# Patient Record
Sex: FEMALE | Race: WHITE | NOT HISPANIC OR LATINO | Employment: PART TIME | URBAN - NONMETROPOLITAN AREA
[De-identification: names, ages, dates, MRNs, and addresses within clinical notes are randomized per-mention and may not be internally consistent; named-entity substitution may affect disease eponyms.]

---

## 2017-01-23 NOTE — ED.PDOC
General


ED Provider: 


Dr. HARI BALDERAS





Chief Complaint: Urinary Problem


Stated Complaint: Burning frequency of urination for couple days, taki Azo not 

getting better.


Time Seen by Physician: 23:22


Mode of Arrival: Walk-In


Information Source: Patient


Nursing and Triage Documentation Reviewed and Agree: Yes





 Complaint Exam





- UTI Female Complaint/Exam


Patient Complains of: Reports: Painful urination


Symptoms Are: Still present


Timing: Constant


Initial Severity: Moderate


Current Severity: Moderate


Location of Pain: Reports: Suprapubic


Associated Signs and Symptoms: Denies: Fever, Chills, Flank pain, Dyspareunia, 

Vaginal discharge


Patient Rh Status: Unknown


: 1


Para: 1


Hx Total # of Abortions (Spontaneous & Elective): 0


Related Surgical History: Reports: None


CVA Tenderness: No


Suprapubic Tenderness: Yes


Differential Diagnoses: Cystitis





Review of Systems





- Review Of Systems


Constitutional: Reports: No symptoms


Eyes: Reports: No symptoms


Ears, Nose, Mouth, Throat: Reports: No symptoms


Respiratory: Reports: No symptoms


Cardiac: Reports: No symptoms


GI: Reports: No symptoms


: Reports: Burning, Dysuria, Frequency


Musculoskeletal: Reports: No symptoms


Skin: Reports: No symptoms


Neurological: Reports: No symptoms


Endocrine: Reports: No symptoms


Hematologic/Lymphatic: Reports: No symptoms


All Other Systems: Reviewed and Negative





Past Medical History





- Past Medical History


Previously Healthy: Yes


Endocrine: Reports: None


Cardiovascular: Reports: None


Respiratory: Reports: None


Hematological: Reports: None


Gastrointestinal: Reports: None


Genitourinary: Reports: None


Neuro/Psych: Reports: None


Musculoskeletal: Reports: None


Cancer: Reports: None


Last Menstrual Period: GETS DEPO SHOT,  VERY IRREGULAR





- Surgical History


General Surgical History: Reports: Tubal ligation, Cholecystectomy





- Family History


Family History: Reports: None





- Social History


Smoking Status: Current every day smoker, Heavy tobacco smoker


Smoking Cessation Counseling Time: > 10 min


Hx Substance Use: No


Alcohol Screening: Occasionally





- Immunizations


Tetanus Shot up to Date: Yes





Physical Exam





- Physical Exam


Appearance: Well-appearing, No pain distress, Well-nourished


Eyes: OTIS, EOMI, Conjunctiva clear


ENT: Ears normal, Nose normal, Oropharynx normal


Respiratory: Airway patent, Breath sounds clear, Breath sounds equal, 

Respirations nonlabored


Cardiovascular: RRR, Pulses normal, No rub, No murmur


GI/: Soft, Nontender, No masses, Bowel sounds normal, No Organomegaly


Musculoskeletal: Normal strength, ROM intact, No edema, No calf tenderness


Skin: Warm, Dry, Normal color


Neurological: Sensation intact, Motor intact, Reflexes intact, Cranial nerves 

intact, Alert, Oriented


Psychiatric: Affect appropriate, Mood appropriate





Critical Care Note





- Critical Care Note


Total Time (mins): 0





Course





- Course


Orders, Labs, Meds: 





Lab Review











  17





  23:10


 


Urine Color  Orange


 


Urine Clarity  Clear


 


Urine pH  7.0


 


Ur Specific Gravity  1.020


 


Urine Protein  1+


 


Urine Glucose (UA)  Trace


 


Urine Ketones  Trace


 


Urine Blood  2+


 


Urine Nitrite  Positive


 


Urine Bilirubin  Negative


 


Urine Urobilinogen  2.0


 


Ur Leukocyte Esterase  Negative


 


Urine Microscopic RBC  5-10


 


Urine Microscopic WBC  2-5


 


Ur Squamous Epith Cells  5-10


 


Urine Bacteria  1+








Orders











 Category Date Time Status


 


 UA [URINALYSIS C & S IF INDICATED] Stat LAB  17 23:10 Completed


 


 URINE CULTURE Stat LAB  17 23:21 Received











Vital Signs: 





 











  Temp Pulse Resp BP Pulse Ox


 


 17 22:52  98.9 F  98 H  18  114/74  98














Departure





- Departure


Time of Disposition: 23:25


Disposition: HOME SELF-CARE


Discharge Problem: 


UTI (urinary tract infection)


Qualifiers:


 Urinary tract infection type: acute cystitis Hematuria presence: with 

hematuria Qualifier Code: (N30.01) Acute cystitis with hematuria





Instructions:  Urinary Tract Infection in Women (ED)


Condition: Stable


Pt referred to PMD for follow-up: Yes


Additional Instructions: 


INCREASE HYDRATION


CONTINUE AZO


MEDICATION SIDE EFFECTS DISCUSSED





Prescriptions: 


Sulfamethoxazole/Trimethoprim [Bactrim Ds 800/160 mg] 1 tab PO Q12HR #20 tablet


Allergies/Adverse Reactions: 


Allergies





No Known Allergies Allergy (Verified 17 22:59)


 








Home Medications: 


Ambulatory Orders





Medroxyprogesterone Acetate [Depo-Provera] 150 mg IM AS DIRECTED 12/15/16 


Sulfamethoxazole/Trimethoprim [Bactrim Ds 800/160 mg] 1 tab PO Q12HR #20 tablet 

17 








Disposition Discussed With: Patient

## 2017-08-03 NOTE — ED.PDOC
General


ED Provider: 


Dr. HARI BALDERAS





Chief Complaint: Non-specific Complaint


Stated Complaint: dizziness weakness, had some congestion and ears full.


Time Seen by Physician: 23:51


Mode of Arrival: Walk-In


Information Source: Patient


Nursing and Triage Documentation Reviewed and Agree: Yes





Neurological Complaint Exam





- Weakness Complaint/Exam


Onset: Gradual


Symptoms Are: Still present


Timing: Intermittent


Episodes Lasting: Hours


Initial Severity: Mild


Current Severity: Mild


Character: Reports: Lightheaded


Aggravating: Reports: Position change


Alleviating: Reports: None


Associated Signs and Symptoms: Denies: Nausea, Vomiting, Diaphoresis, Tinnitus, 

Chest pain, Short of air, Palpitations, Unsteady gait, GI blood loss, Visual 

changes, Decreased oral intake, Change in medication, Change in diet, OTC meds, 

Loss of balance


Cardiac Risk Factors: Reports: None


CVA Risk Factors: Reports: None


Related Surgical History: Reports: None


JVD Present: No


Carotid Bruit Present: No


Rectal Heme Positive: No


Nystagmus Present: No


Gag Reflex Present: No


Meningeal Signs Positive: No


Focal Weakness: Present: None


Focal Sensory Loss: Present: None


Gait: Normal


Finger-to-Nose: Normal Findings


Romberg Test Positive: No


Babinski Sign: Negative Right, Negative Left


Heel to Toe Normal: No


Differential Diagnoses: Labyrinthitis, Meniere's, Metabolic abnormalities, 

Other (viral syndrome)





Review of Systems





- Review Of Systems


Constitutional: Reports: Malaise, Weakness


Eyes: Reports: No symptoms


Ears, Nose, Mouth, Throat: Reports: No symptoms


Respiratory: Reports: No symptoms


Cardiac: Reports: No symptoms


GI: Reports: No symptoms


: Reports: No symptoms


Musculoskeletal: Reports: No symptoms


Skin: Reports: No symptoms


Neurological: Reports: No symptoms


Endocrine: Reports: No symptoms


Hematologic/Lymphatic: Reports: No symptoms


All Other Systems: Reviewed and Negative





Past Medical History





- Past Medical History


Previously Healthy: Yes


Endocrine: Reports: None


Cardiovascular: Reports: None


Respiratory: Reports: None


Hematological: Reports: None


Gastrointestinal: Reports: None


Genitourinary: Reports: None


Neuro/Psych: Reports: None


Musculoskeletal: Reports: None


Cancer: Reports: None


Last Menstrual Period: YESTERDAY, IRREGULAR





- Surgical History


General Surgical History: Reports: Tubal ligation, Cholecystectomy





- Family History


Family History: Reports: None





- Social History


Smoking Status: Current every day smoker, Heavy tobacco smoker


Hx Substance Use: No


Alcohol Screening: Occasionally





- Immunizations


Tetanus Shot up to Date: Yes





Physical Exam





- Physical Exam


Appearance: Well-appearing, No pain distress, Well-nourished


Eyes: OTIS, EOMI, Conjunctiva clear


ENT: Ears normal, Nose normal, Oropharynx normal


Respiratory: Airway patent, Breath sounds clear, Breath sounds equal, 

Respirations nonlabored


Cardiovascular: RRR, Pulses normal, No rub, No murmur


GI/: Soft, Nontender, No masses, Bowel sounds normal, No Organomegaly


Musculoskeletal: Normal strength, ROM intact, No edema, No calf tenderness


Skin: Warm, Dry, Normal color


Neurological: Sensation intact, Motor intact, Reflexes intact, Cranial nerves 

intact, Alert, Oriented


Psychiatric: Affect appropriate, Mood appropriate





Critical Care Note





- Critical Care Note


Total Time (mins): 0





Course





- Course


Orders, Labs, Meds: 





Orders











 Category Date Time Status


 


 CBC W/ AUTO DIFF Stat LAB  08/03/17 23:50 Ordered


 


 COMPREHENSIVE METABOLIC PANEL Stat LAB  08/03/17 23:51 Ordered


 


 URINALYSIS C & S IF INDICATED Stat LAB  08/03/17 23:51 Uncollected


 


 URINE PREGNANCY Stat LAB  08/03/17 23:51 Uncollected


 


 Dexamethasone 4 mg/ml Inj [Decadron 4 mg/ml Sdv] MEDS  08/03/17 23:51 Stat





 4 mg IM ONCE STA   











Vital Signs: 





 











  Temp Pulse Resp BP Pulse Ox


 


 08/03/17 23:37  98.1 F  70  20  111/71  99














Departure





- Departure


Time of Disposition: 23:54


Disposition: HOME SELF-CARE


Discharge Problem: 


 General symptom





Instructions:  Viral Syndrome (ED)


Condition: Good


Pt referred to PMD for follow-up: No


Additional Instructions: 


Increase hydration


Tylenol prn


Needs f/u with PMD if not better.





Allergies/Adverse Reactions: 


Allergies





No Known Allergies Allergy (Verified 08/03/17 23:43)


 








Home Medications: 


Ambulatory Orders





1 [No Reported Medications]  08/03/17 








Disposition Discussed With: Patient, Family

## 2017-08-14 ENCOUNTER — OFFICE VISIT (OUTPATIENT)
Dept: OBSTETRICS AND GYNECOLOGY | Facility: CLINIC | Age: 32
End: 2017-08-14

## 2017-08-14 VITALS
WEIGHT: 179 LBS | HEIGHT: 63 IN | DIASTOLIC BLOOD PRESSURE: 82 MMHG | SYSTOLIC BLOOD PRESSURE: 108 MMHG | BODY MASS INDEX: 31.71 KG/M2

## 2017-08-14 DIAGNOSIS — Z01.411 ENCOUNTER FOR GYNECOLOGICAL EXAMINATION WITH ABNORMAL FINDING: Primary | ICD-10-CM

## 2017-08-14 DIAGNOSIS — N92.0 MENORRHAGIA WITH REGULAR CYCLE: ICD-10-CM

## 2017-08-14 PROCEDURE — G0123 SCREEN CERV/VAG THIN LAYER: HCPCS | Performed by: OBSTETRICS & GYNECOLOGY

## 2017-08-14 PROCEDURE — 99395 PREV VISIT EST AGE 18-39: CPT | Performed by: OBSTETRICS & GYNECOLOGY

## 2017-08-14 NOTE — PROGRESS NOTES
Subjective   Ana Sharma is a 32 y.o. female who is here for a yearly gyn exam      History of Present Illness  Patient is concerned with heavy menses that are saturating her outer garments despite wearing a super tampon and pad.  Patient is also having severe dysmenorrhea.  The following portions of the patient's history were reviewed and updated as appropriate: allergies, current medications, past family history, past medical history, past social history, past surgical history and problem list.    Review of Systems   Constitutional: Negative for fatigue and unexpected weight change.   HENT: Negative.    Eyes: Negative.    Respiratory: Negative for cough, chest tightness and shortness of breath.    Cardiovascular: Negative for chest pain, palpitations and leg swelling.   Gastrointestinal: Negative for abdominal distention, abdominal pain, anal bleeding, blood in stool, constipation, diarrhea, nausea and vomiting.   Endocrine: Negative for polydipsia and polyuria.   Genitourinary: Positive for menstrual problem. Negative for difficulty urinating, dyspareunia, dysuria, frequency, genital sores, hematuria, pelvic pain, urgency, vaginal bleeding, vaginal discharge and vaginal pain.        Dysmenorrhea and menorrhagia   Musculoskeletal: Negative.    Skin: Negative for color change and rash.   Allergic/Immunologic: Negative.    Neurological: Negative.  Negative for dizziness, seizures, syncope, light-headedness and headaches.   Hematological: Negative for adenopathy. Does not bruise/bleed easily.   Psychiatric/Behavioral: Negative for agitation, confusion, dysphoric mood, sleep disturbance and suicidal ideas. The patient is not nervous/anxious.        Objective   Physical Exam   Constitutional: She is oriented to person, place, and time. She appears well-developed and well-nourished.   HENT:   Head: Normocephalic.   Eyes: Conjunctivae are normal. Pupils are equal, round, and reactive to light. Right eye exhibits no  discharge. Left eye exhibits no discharge.   Neck: Normal range of motion. Neck supple. No tracheal deviation present. No thyromegaly present.   Cardiovascular: Normal rate, regular rhythm and normal heart sounds.    Pulmonary/Chest: Effort normal and breath sounds normal. She has no wheezes. She has no rales. Right breast exhibits no inverted nipple, no mass, no nipple discharge, no skin change and no tenderness. Left breast exhibits no inverted nipple, no mass, no nipple discharge, no skin change and no tenderness. Breasts are symmetrical. There is no breast swelling.   Abdominal: Soft. Bowel sounds are normal. She exhibits no distension and no mass. There is no tenderness. There is no rebound and no guarding. No hernia. Hernia confirmed negative in the right inguinal area and confirmed negative in the left inguinal area.   Genitourinary: Rectum normal and vagina normal. Rectal exam shows no external hemorrhoid, no internal hemorrhoid, no fissure, no mass, no tenderness and anal tone normal. No breast tenderness, discharge or bleeding. Pelvic exam was performed with patient supine. No labial fusion. There is no rash, tenderness, lesion or injury on the right labia. There is no rash, tenderness, lesion or injury on the left labia. Uterus is tender. Cervix exhibits no motion tenderness, no discharge and no friability. Right adnexum displays no mass, no tenderness and no fullness. Left adnexum displays no mass, no tenderness and no fullness. No erythema, tenderness or bleeding in the vagina. No foreign body in the vagina. No signs of injury around the vagina. No vaginal discharge found.   Genitourinary Comments: BUS glands appear nl, perineum intact, urethral orifice appears nl, vagina has good support.   Musculoskeletal: Normal range of motion.   Lymphadenopathy:        Right: No inguinal adenopathy present.        Left: No inguinal adenopathy present.   Neurological: She is alert and oriented to person, place, and  time. She has normal reflexes.   Skin: Skin is warm and dry. No rash noted.   Psychiatric: She has a normal mood and affect. Her behavior is normal. Judgment and thought content normal.   Nursing note and vitals reviewed.        Assessment/Plan     WWE  Pap performed  Menorrhagia  Dysmenorrhea  Patient is s/p an Essure placement and one of the tubes had a slight amount of passage of dye.  She did not repeat another HSG in 3 months and has also stopped depo provera.   CBC, TSH, T4 ordered  RV 1 wk for vaginal US.

## 2017-08-15 LAB
BASOPHILS # BLD AUTO: 0 X10E3/UL (ref 0–0.2)
BASOPHILS NFR BLD AUTO: 0 %
EOSINOPHIL # BLD AUTO: 0.2 X10E3/UL (ref 0–0.4)
EOSINOPHIL NFR BLD AUTO: 3 %
ERYTHROCYTE [DISTWIDTH] IN BLOOD BY AUTOMATED COUNT: 12.8 % (ref 12.3–15.4)
HCT VFR BLD AUTO: 42.8 % (ref 34–46.6)
HGB BLD-MCNC: 13.6 G/DL (ref 11.1–15.9)
IMM GRANULOCYTES # BLD: 0 X10E3/UL (ref 0–0.1)
IMM GRANULOCYTES NFR BLD: 0 %
LYMPHOCYTES # BLD AUTO: 2.8 X10E3/UL (ref 0.7–3.1)
LYMPHOCYTES NFR BLD AUTO: 32 %
MCH RBC QN AUTO: 27.5 PG (ref 26.6–33)
MCHC RBC AUTO-ENTMCNC: 31.8 G/DL (ref 31.5–35.7)
MCV RBC AUTO: 87 FL (ref 79–97)
MONOCYTES # BLD AUTO: 0.5 X10E3/UL (ref 0.1–0.9)
MONOCYTES NFR BLD AUTO: 6 %
NEUTROPHILS # BLD AUTO: 5.1 X10E3/UL (ref 1.4–7)
NEUTROPHILS NFR BLD AUTO: 59 %
PLATELET # BLD AUTO: 322 X10E3/UL (ref 150–379)
RBC # BLD AUTO: 4.94 X10E6/UL (ref 3.77–5.28)
T4 SERPL-MCNC: 8 UG/DL (ref 4.5–12)
TSH SERPL DL<=0.005 MIU/L-ACNC: 1.01 UIU/ML (ref 0.45–4.5)
WBC # BLD AUTO: 8.7 X10E3/UL (ref 3.4–10.8)

## 2017-08-16 LAB
GEN CATEG CVX/VAG CYTO-IMP: NORMAL
LAB AP CASE REPORT: NORMAL
LAB AP GYN ADDITIONAL INFORMATION: NORMAL
Lab: NORMAL
PATH INTERP SPEC-IMP: NORMAL
STAT OF ADQ CVX/VAG CYTO-IMP: NORMAL

## 2017-09-21 NOTE — ED.PDOC
General


ED Provider: 


Dr. JEANINE GRANT





Chief Complaint: Nausea/Vomiting


Stated Complaint: Patient is a 32 year old female who comes to the ER with 

complans of 2 days of nausea, vomting, diarrhea and dysurea.


Time Seen by Physician: 21:02


Mode of Arrival: Walk-In


Information Source: Patient


Exam Limitations: No limitations


[facility] 


[facility]





Nursing and Triage Documentation Reviewed and Agree: Yes





GI Complaint Exam





- Vomiting/Diarrhea Complaint/Exam


Onset/Duration: 2 days 


Symptoms Are: Still present


Episodes of Vomiting over last 24 Hours: 5


Episodes of Diarrhea Over Last 24 Hours: 7


Initial Severity: Moderate


Current Severity: Severe


Character of Vomiting: Reports: Non-bilious


Character of Diarrhea: Reports: Watery


Aggravating: Reports: Liquids


Alleviating: Reports: None


Associated Signs and Symptoms: Reports: Abdominal pain, Cramping.  Denies: 

Dizziness, Light-headedness, Melena, Hematemesis, Fever


Recent Positive Pregnancy Test: No


Use of Oral Contraceptives: No


Use of Depoprovera: No


Compliant With Contraceptive Use: No


Non-GI Risk Factors: Reports: None


Surgical Obstruction Risk Factors: Reports: None


Related Surgical History: Reports: Cholecystectomy


Abdominal Findings: Present: None


Kussmaul Respirations Present: No





Review of Systems





- Review Of Systems


Constitutional: Reports: No symptoms


Eyes: Reports: No symptoms


Ears, Nose, Mouth, Throat: Reports: No symptoms


Respiratory: Reports: No symptoms


Cardiac: Reports: No symptoms


GI: Reports: Abdominal pain, Diarrhea, Nausea, Vomiting


: Reports: No symptoms


Musculoskeletal: Reports: No symptoms


Skin: Reports: No symptoms


Neurological: Reports: No symptoms


Endocrine: Reports: No symptoms


Hematologic/Lymphatic: Reports: No symptoms


All Other Systems: Reviewed and Negative





Past Medical History





- Past Medical History


Previously Healthy: Yes


Endocrine: Reports: None


Cardiovascular: Reports: None


Respiratory: Reports: None


Hematological: Reports: None


Gastrointestinal: Reports: None


Genitourinary: Reports: None


Neuro/Psych: Reports: None


Musculoskeletal: Reports: None


Cancer: Reports: None


Last Menstrual Period: current





- Surgical History


General Surgical History: Reports: Tubal ligation, Cholecystectomy





- Family History


Family History: Reports: None





- Social History


Smoking Status: Current every day smoker, Heavy tobacco smoker


Hx Substance Use: No


Alcohol Screening: None





- Immunizations


Tetanus Shot up to Date: Yes





Physical Exam





- Physical Exam


Appearance: Ill-appearing, Well-nourished


Ill-appearing: Moderate


Pain Distress: Moderate


Eyes: OTIS, EOMI, Conjunctiva clear


Neck: Supple


Respiratory: Airway patent, Breath sounds clear, Breath sounds equal, 

Respirations nonlabored


Cardiovascular: RRR, Pulses normal, No rub, No murmur


GI/: Soft, No masses, Bowel sounds normal, No Organomegaly, Tender


Musculoskeletal: Normal strength, ROM intact, No edema, No calf tenderness


Skin: Warm, Dry, Normal color


Neurological: Sensation intact, Motor intact, Alert, Oriented


Psychiatric: Anxious





Interpretation





- Radiology Interpretation


Radiology Interpretation By: Radiologist


Radiology Results: Negative


Exam Interpreted: CT Scan





Critical Care Note





- Critical Care Note


Total Time (mins): 0





Course





- Course


Hematology/Chemistry: 


 09/21/17 20:57





 09/21/17 20:57


Orders, Labs, Meds: 


Lab Review











  09/21/17 09/21/17 09/21/17





  20:53 20:57 20:57


 


WBC   11.32 H 


 


RBC   4.62 


 


Hgb   12.6 


 


Hct   38.9 


 


MCV   84.2 


 


MCH   27.3 


 


MCHC   32.4 


 


RDW Coeff of Joseph   13.1 


 


Plt Count   238 


 


Immature Gran % (Auto)   0.6 


 


Neut % (Auto)   72.6 


 


Lymph % (Auto)   18.4 


 


Mono % (Auto)   6.8 


 


Eos % (Auto)   1.3 


 


Baso % (Auto)   0.3 


 


Immature Gran # (Auto)   0.1 


 


Neut #   8.2 H 


 


Lymph #   2.1 


 


Mono #   0.8 


 


Eos #   0.2 


 


Baso #   0.0 


 


Sodium    143


 


Potassium    3.6


 


Chloride    110 H


 


Carbon Dioxide    23


 


Anion Gap    13.6


 


BUN    8


 


Creatinine    0.72


 


Estimated GFR (MDRD)    95.00


 


BUN/Creatinine Ratio    11.11


 


Glucose    89


 


Calcium    9.0


 


Total Bilirubin    0.63


 


AST    29


 


ALT    39


 


Alkaline Phosphatase    170 H


 


Total Protein    6.9


 


Albumin    3.6


 


Globulin    3.3


 


Albumin/Globulin Ratio    1.09


 


Amylase    46


 


Lipase    11


 


Serum Pregnancy, Qual   


 


Urine Color  Yellow  


 


Urine Clarity  Clear  


 


Urine pH  5.5  


 


Ur Specific Gravity  >=1.030  


 


Urine Protein  Trace  


 


Urine Glucose (UA)  Negative  


 


Urine Ketones  Trace  


 


Urine Blood  2+  


 


Urine Nitrite  Negative  


 


Urine Bilirubin  1+  


 


Urine Urobilinogen  0.2  


 


Ur Leukocyte Esterase  Negative  


 


Urine Microscopic RBC  10-20  


 


Urine Microscopic WBC  0-2  


 


Ur Squamous Epith Cells  10-20  














  09/21/17





  20:57


 


WBC 


 


RBC 


 


Hgb 


 


Hct 


 


MCV 


 


MCH 


 


MCHC 


 


RDW Coeff of Joseph 


 


Plt Count 


 


Immature Gran % (Auto) 


 


Neut % (Auto) 


 


Lymph % (Auto) 


 


Mono % (Auto) 


 


Eos % (Auto) 


 


Baso % (Auto) 


 


Immature Gran # (Auto) 


 


Neut # 


 


Lymph # 


 


Mono # 


 


Eos # 


 


Baso # 


 


Sodium 


 


Potassium 


 


Chloride 


 


Carbon Dioxide 


 


Anion Gap 


 


BUN 


 


Creatinine 


 


Estimated GFR (MDRD) 


 


BUN/Creatinine Ratio 


 


Glucose 


 


Calcium 


 


Total Bilirubin 


 


AST 


 


ALT 


 


Alkaline Phosphatase 


 


Total Protein 


 


Albumin 


 


Globulin 


 


Albumin/Globulin Ratio 


 


Amylase 


 


Lipase 


 


Serum Pregnancy, Qual  Negative


 


Urine Color 


 


Urine Clarity 


 


Urine pH 


 


Ur Specific Gravity 


 


Urine Protein 


 


Urine Glucose (UA) 


 


Urine Ketones 


 


Urine Blood 


 


Urine Nitrite 


 


Urine Bilirubin 


 


Urine Urobilinogen 


 


Ur Leukocyte Esterase 


 


Urine Microscopic RBC 


 


Urine Microscopic WBC 


 


Ur Squamous Epith Cells 








Orders











 Category Date Time Status


 


 ED IV/MEDIPORT/POWERPORT .ONCE EMERGENCY  09/21/17 20:47 Active


 


 AMYLASE Stat LAB  09/21/17 20:57 Completed


 


 CBC W/ AUTO DIFF Stat LAB  09/21/17 20:57 Completed


 


 COMPREHENSIVE METABOLIC PANEL Stat LAB  09/21/17 20:57 Completed


 


 HCG QUALITATIVE [SERUM PREGNANCY] Stat LAB  09/21/17 20:57 Completed


 


 LIPASE Stat LAB  09/21/17 20:57 Completed


 


 URINALYSIS C & S IF INDICATED Stat LAB  09/21/17 20:53 Completed


 


 0.9 % Sodium Chloride [Saline Flush] MEDS  09/21/17 20:47 Discontinued





 1 syr IVF PRN PRN   


 


 Metoclopramide HCl [Reglan] MEDS  09/21/17 21:58 Discontinued





 10 mg IVP ONCE STA   


 


 Ondansetron HCl/Pf [Zofran 4 mg/2 ml] MEDS  09/21/17 20:47 Discontinued





 4 mg IVP ONCE STA   


 


 Pantoprazole Sodium [Protonix IV] MEDS  09/21/17 20:47 Discontinued





 40 mg IVP ONCE STA   


 


 Sodium Chloride 0.9% [Sodium Chloride] 1,000 ml MEDS  09/21/17 20:47 

Discontinued





 IV BOLUS   


 


 Sodium Chloride 0.9% [Sodium Chloride] 1,000 ml MEDS  09/21/17 21:58 

Discontinued





 IV BOLUS   


 


 CT ABD/PEL WO RENAL STONE PROT Stat RADS  09/21/17 20:47 Completed








Medications














Discontinued Medications














Generic Name Dose Route Start Last Admin





  Trade Name Freq  PRN Reason Stop Dose Admin


 


Sodium Chloride  1,000 mls @ 1,000 mls/hr  09/21/17 20:47  09/21/17 21:15





  Sodium Chloride  IV  09/21/17 21:46  1,000 mls/hr





  BOLUS STA   Administration


 


Sodium Chloride  1,000 mls @ 1,000 mls/hr  09/21/17 21:58  09/21/17 22:06





  Sodium Chloride  IV  09/21/17 22:57  1,000 mls/hr





  BOLUS STA   Administration


 


Metoclopramide HCl  10 mg  09/21/17 21:58  09/21/17 22:06





  Reglan  IVP  09/21/17 21:59  10 mg





  ONCE STA   Administration


 


Ondansetron HCl  4 mg  09/21/17 20:47  09/21/17 21:15





  Zofran 4 Mg/2 Ml  IVP  09/21/17 20:48  4 mg





  ONCE STA   Administration


 


Pantoprazole Sodium  40 mg  09/21/17 20:47  09/21/17 21:16





  Protonix Iv  IVP  09/21/17 20:48  40 mg





  ONCE STA   Administration


 


Sodium Chloride  1 syr  09/21/17 20:47  09/21/17 21:16





  Saline Flush  IVF   1 syr





  PRN PRN   Administration





  To flush IV   











Vital Signs: 


 











  Temp Pulse Resp BP Pulse Ox


 


 09/21/17 23:11   60  18  104/60  98


 


 09/21/17 20:36  97.7 F  82  18  133/75  98














Departure





- Departure


Time of Disposition: 23:40


Disposition: HOME SELF-CARE


Discharge Problem: 


 Gastroenteritis





Instructions:  Gastroenteritis (ED)


Condition: Fair


Pt referred to PMD for follow-up: Yes


Additional Instructions: 


Push fluids 


Follow up with PCP in 3 days


Prescriptions: 


Dicyclomine HCl [Bentyl] 10 mg PO TID PRN #14 capsule


 PRN Reason: Abdominal Pain


Diphenoxylate HCl/Atropine [Lomotil 2.5-0.025 mg Tablet] 1 each PO TID PRN #15 

tablet


 PRN Reason: Diarrhea


Ondansetron HCl [Zofran Tab] 4 mg PO Q8H PRN #14 tablet


 PRN Reason: Nausea / Vomiting


Phenazopyridine HCl [Pyridium] 100 mg PO TID PRN #10 tablet


 PRN Reason: Urinary Burning. 


Allergies/Adverse Reactions: 


Allergies





No Known Allergies Allergy (Verified 09/21/17 20:39)


 








Home Medications: 


Ambulatory Orders





Dicyclomine HCl [Bentyl] 10 mg PO TID PRN #14 capsule 09/21/17 


Diphenoxylate HCl/Atropine [Lomotil 2.5-0.025 mg Tablet] 1 each PO TID PRN #15 

tablet 09/21/17 


Ondansetron HCl [Zofran Tab] 4 mg PO Q8H PRN #14 tablet 09/21/17 


Phenazopyridine HCl [Pyridium] 100 mg PO TID PRN #10 tablet 09/21/17 








Disposition Discussed With: Patient, Family

## 2017-09-21 NOTE — CT
Exam:  CT of the abdomen and pelvis without contrast 

  

History:  Abdominal pain with nausea vomiting 

  

Technique:  3 mm CT of the abdomen and pelvis without intravascular contrast 

  

FINDINGS:  The lung bases are clear. No significant liver abnormality. The adrenals, pancreas and spl
een are unremarkable. The stomach and hiatus are unremarkable.Prior cholecystectomy. Kidneys and prox
imal collecting system are unremarkable. The appendix is normal. Bowel loops demonstrate normal calib
er. No inflamatory change seen in the mesentery or retroperitoneum. Vascular structures appear normal
 by noncontrast CT.  Central mesenteric lymph nodes are abundant but not enlarged. 

  

Essure occlusive devices in place.  Tampon in the vagina. Pelvic genitourinary structures appear norm
al. Pelvic bowel loops are unremarkable. No inflammatory change in the pelvic fat. No acute abnormali
ty of the abdominal or pelvic skeleton. 

  

Impression: 

1.  No inflammatory process, bowel or urinary obstruction is seen. 

2.  Nonspecific central mesenteric lymph node abundance without pathologic enlargement.

## 2017-09-27 ENCOUNTER — HOSPITAL ENCOUNTER (EMERGENCY)
Facility: HOSPITAL | Age: 32
Discharge: HOME | End: 2017-09-28
Attending: EMERGENCY MEDICINE | Admitting: EMERGENCY MEDICINE

## 2017-09-27 DIAGNOSIS — N39.0 ACUTE UTI (URINARY TRACT INFECTION): Primary | ICD-10-CM

## 2017-09-27 PROCEDURE — 99283 EMERGENCY DEPT VISIT LOW MDM: CPT

## 2017-09-27 PROCEDURE — P9612 CATHETERIZE FOR URINE SPEC: HCPCS

## 2017-09-27 RX ORDER — DICYCLOMINE HYDROCHLORIDE 10 MG/1
10 CAPSULE ORAL
COMMUNITY

## 2017-09-27 RX ORDER — ONDANSETRON 2 MG/ML
4 INJECTION INTRAMUSCULAR; INTRAVENOUS ONCE
Status: COMPLETED | OUTPATIENT
Start: 2017-09-27 | End: 2017-09-28

## 2017-09-27 RX ORDER — ONDANSETRON 4 MG/1
4 TABLET, FILM COATED ORAL EVERY 8 HOURS PRN
COMMUNITY

## 2017-09-28 VITALS
DIASTOLIC BLOOD PRESSURE: 60 MMHG | OXYGEN SATURATION: 100 % | BODY MASS INDEX: 35.14 KG/M2 | HEART RATE: 65 BPM | RESPIRATION RATE: 16 BRPM | WEIGHT: 179 LBS | HEIGHT: 60 IN | TEMPERATURE: 98 F | SYSTOLIC BLOOD PRESSURE: 120 MMHG

## 2017-09-28 LAB
ALBUMIN SERPL-MCNC: 4 G/DL (ref 3.5–5)
ALBUMIN/GLOB SERPL: 1.4 G/DL (ref 1.1–2.5)
ALP SERPL-CCNC: 166 U/L (ref 24–120)
ALT SERPL W P-5'-P-CCNC: 69 U/L (ref 0–54)
AMYLASE SERPL-CCNC: 50 U/L (ref 30–110)
ANION GAP SERPL CALCULATED.3IONS-SCNC: 11 MMOL/L (ref 4–13)
AST SERPL-CCNC: 35 U/L (ref 7–45)
BACTERIA UR QL AUTO: ABNORMAL /HPF
BASOPHILS # BLD AUTO: 0.04 10*3/MM3 (ref 0–0.2)
BASOPHILS NFR BLD AUTO: 0.4 % (ref 0–2)
BILIRUB SERPL-MCNC: 0.4 MG/DL (ref 0.1–1)
BILIRUB UR QL STRIP: ABNORMAL
BUN BLD-MCNC: 12 MG/DL (ref 5–21)
BUN/CREAT SERPL: 19.4 (ref 7–25)
CALCIUM SPEC-SCNC: 8.8 MG/DL (ref 8.4–10.4)
CHLORIDE SERPL-SCNC: 103 MMOL/L (ref 98–110)
CLARITY UR: ABNORMAL
CO2 SERPL-SCNC: 30 MMOL/L (ref 24–31)
COLOR UR: ABNORMAL
CREAT BLD-MCNC: 0.62 MG/DL (ref 0.5–1.4)
CRP SERPL-MCNC: <0.5 MG/DL (ref 0–0.99)
DEPRECATED RDW RBC AUTO: 41.8 FL (ref 40–54)
EOSINOPHIL # BLD AUTO: 0.11 10*3/MM3 (ref 0–0.7)
EOSINOPHIL NFR BLD AUTO: 1.1 % (ref 0–4)
ERYTHROCYTE [DISTWIDTH] IN BLOOD BY AUTOMATED COUNT: 13.4 % (ref 12–15)
GFR SERPL CREATININE-BSD FRML MDRD: 113 ML/MIN/1.73
GLOBULIN UR ELPH-MCNC: 2.9 GM/DL
GLUCOSE BLD-MCNC: 95 MG/DL (ref 70–100)
GLUCOSE UR STRIP-MCNC: NEGATIVE MG/DL
HCG SERPL QL: NEGATIVE
HCT VFR BLD AUTO: 37.7 % (ref 37–47)
HGB BLD-MCNC: 11.9 G/DL (ref 12–16)
HGB UR QL STRIP.AUTO: NEGATIVE
HYALINE CASTS UR QL AUTO: ABNORMAL /LPF
IMM GRANULOCYTES # BLD: 0.02 10*3/MM3 (ref 0–0.03)
IMM GRANULOCYTES NFR BLD: 0.2 % (ref 0–5)
KETONES UR QL STRIP: NEGATIVE
LEUKOCYTE ESTERASE UR QL STRIP.AUTO: ABNORMAL
LIPASE SERPL-CCNC: 49 U/L (ref 23–203)
LYMPHOCYTES # BLD AUTO: 2.39 10*3/MM3 (ref 0.72–4.86)
LYMPHOCYTES NFR BLD AUTO: 24.9 % (ref 15–45)
MCH RBC QN AUTO: 27 PG (ref 28–32)
MCHC RBC AUTO-ENTMCNC: 31.6 G/DL (ref 33–36)
MCV RBC AUTO: 85.5 FL (ref 82–98)
MONOCYTES # BLD AUTO: 0.62 10*3/MM3 (ref 0.19–1.3)
MONOCYTES NFR BLD AUTO: 6.5 % (ref 4–12)
MUCOUS THREADS URNS QL MICRO: ABNORMAL /HPF
NEUTROPHILS # BLD AUTO: 6.41 10*3/MM3 (ref 1.87–8.4)
NEUTROPHILS NFR BLD AUTO: 66.9 % (ref 39–78)
NITRITE UR QL STRIP: POSITIVE
PH UR STRIP.AUTO: <=5 [PH] (ref 5–8)
PLATELET # BLD AUTO: 290 10*3/MM3 (ref 130–400)
PMV BLD AUTO: 11.1 FL (ref 6–12)
POTASSIUM BLD-SCNC: 3.3 MMOL/L (ref 3.5–5.3)
PROT SERPL-MCNC: 6.9 G/DL (ref 6.3–8.7)
PROT UR QL STRIP: NEGATIVE
RBC # BLD AUTO: 4.41 10*6/MM3 (ref 4.2–5.4)
RBC # UR: ABNORMAL /HPF
REF LAB TEST METHOD: ABNORMAL
SODIUM BLD-SCNC: 144 MMOL/L (ref 135–145)
SP GR UR STRIP: >1.03 (ref 1–1.03)
SQUAMOUS #/AREA URNS HPF: ABNORMAL /HPF
UROBILINOGEN UR QL STRIP: ABNORMAL
WBC NRBC COR # BLD: 9.59 10*3/MM3 (ref 4.8–10.8)
WBC UR QL AUTO: ABNORMAL /HPF

## 2017-09-28 PROCEDURE — 87086 URINE CULTURE/COLONY COUNT: CPT | Performed by: EMERGENCY MEDICINE

## 2017-09-28 PROCEDURE — 81001 URINALYSIS AUTO W/SCOPE: CPT | Performed by: EMERGENCY MEDICINE

## 2017-09-28 PROCEDURE — 96374 THER/PROPH/DIAG INJ IV PUSH: CPT

## 2017-09-28 PROCEDURE — 25010000002 ONDANSETRON PER 1 MG: Performed by: EMERGENCY MEDICINE

## 2017-09-28 PROCEDURE — 87186 SC STD MICRODIL/AGAR DIL: CPT | Performed by: EMERGENCY MEDICINE

## 2017-09-28 PROCEDURE — 86140 C-REACTIVE PROTEIN: CPT | Performed by: EMERGENCY MEDICINE

## 2017-09-28 PROCEDURE — 84703 CHORIONIC GONADOTROPIN ASSAY: CPT | Performed by: EMERGENCY MEDICINE

## 2017-09-28 PROCEDURE — 80053 COMPREHEN METABOLIC PANEL: CPT | Performed by: EMERGENCY MEDICINE

## 2017-09-28 PROCEDURE — 96361 HYDRATE IV INFUSION ADD-ON: CPT

## 2017-09-28 PROCEDURE — 83690 ASSAY OF LIPASE: CPT | Performed by: EMERGENCY MEDICINE

## 2017-09-28 PROCEDURE — 87088 URINE BACTERIA CULTURE: CPT | Performed by: EMERGENCY MEDICINE

## 2017-09-28 PROCEDURE — 82150 ASSAY OF AMYLASE: CPT | Performed by: EMERGENCY MEDICINE

## 2017-09-28 PROCEDURE — 85025 COMPLETE CBC W/AUTO DIFF WBC: CPT | Performed by: EMERGENCY MEDICINE

## 2017-09-28 RX ORDER — CIPROFLOXACIN 500 MG/1
500 TABLET, FILM COATED ORAL 2 TIMES DAILY
Qty: 10 TABLET | Refills: 0 | Status: SHIPPED | OUTPATIENT
Start: 2017-09-28 | End: 2017-10-03

## 2017-09-28 RX ADMIN — ONDANSETRON 4 MG: 2 INJECTION INTRAMUSCULAR; INTRAVENOUS at 00:09

## 2017-09-28 RX ADMIN — SODIUM CHLORIDE 1000 ML: 9 INJECTION, SOLUTION INTRAVENOUS at 00:08

## 2017-10-01 LAB
BACTERIA SPEC AEROBE CULT: ABNORMAL

## 2018-03-25 ENCOUNTER — HOSPITAL ENCOUNTER (EMERGENCY)
Dept: HOSPITAL 58 - ED | Age: 33
Discharge: HOME | End: 2018-03-25

## 2018-03-25 VITALS — BODY MASS INDEX: 29.3 KG/M2

## 2018-03-25 VITALS — SYSTOLIC BLOOD PRESSURE: 120 MMHG | DIASTOLIC BLOOD PRESSURE: 79 MMHG | TEMPERATURE: 98.8 F

## 2018-03-25 DIAGNOSIS — F17.210: ICD-10-CM

## 2018-03-25 DIAGNOSIS — N30.90: Primary | ICD-10-CM

## 2018-03-25 PROCEDURE — 99283 EMERGENCY DEPT VISIT LOW MDM: CPT

## 2018-03-25 PROCEDURE — 87086 URINE CULTURE/COLONY COUNT: CPT

## 2018-03-25 PROCEDURE — 81001 URINALYSIS AUTO W/SCOPE: CPT

## 2018-03-25 NOTE — ED.PDOC
General


ED Provider: 


Dr. JOSELINE MARCOS-ER





Chief Complaint: Urinary Problem


Stated Complaint: it hurts to pee


Time Seen by Physician: 15:30


Mode of Arrival: Walk-In


Information Source: Patient


Exam Limitations: No limitations


Primary Care Provider: 


HARI BALDERAS-[facility]





Nursing and Triage Documentation Reviewed and Agree: Yes


Reviewed sepsis parameters & appropriate labs ordered?: Yes


System Inflammatory Response Syndrome: Not Applicable


Sepsis Protocol: 


For patient's 13 years and over:





Temp is 96.8 and below  and greater


Pulse >90 BPM


Resp >20/minute


Acutely Altered Mental Status





Are patient's symptoms suggestive of a new infection, such as:


   -Pneumonia


   -Skin, Soft Tissue


   -Endocarditis


   -UTI


   -Bone, Joint Infection


   -Implantable Device


   -Acute Abdominal Infection


   -Wound Infection


   -Meningitis


   -Blood Stream Catheter Infection


   -Unknown








 Complaint Exam





- UTI Female Complaint/Exam


Patient Complains of: Reports: Painful urination


Onset/Duration: 2 days


Symptoms Are: Still present


Initial Severity: Mild


Current Severity: Mild


Location of Pain: Reports: Suprapubic


Associated Signs and Symptoms: Denies: Fever, Chills, Flank pain, Dyspareunia, 

Vaginal discharge


Patient Rh Status: Unknown


CVA Tenderness: No


Suprapubic Tenderness: No


Differential Diagnoses: Cystitis





Review of Systems





- Review Of Systems


Constitutional: Reports: No symptoms


Eyes: Reports: No symptoms


Ears, Nose, Mouth, Throat: Reports: No symptoms


Respiratory: Reports: No symptoms


Cardiac: Reports: No symptoms


GI: Reports: No symptoms


: Reports: Dysuria, Frequency, Pain, Urgency


Musculoskeletal: Reports: No symptoms


Skin: Reports: No symptoms


Neurological: Reports: No symptoms


Endocrine: Reports: No symptoms


Hematologic/Lymphatic: Reports: No symptoms


All Other Systems: Reviewed and Negative





Past Medical History





- Past Medical History


Previously Healthy: Yes


Endocrine: Reports: None


Cardiovascular: Reports: None


Respiratory: Reports: None


Hematological: Reports: None


Gastrointestinal: Reports: None


Genitourinary: Reports: None


Neuro/Psych: Reports: None


Musculoskeletal: Reports: None


Cancer: Reports: None


Last Menstrual Period: 1 month ago - irregular





- Surgical History


General Surgical History: Reports: Tubal ligation, Cholecystectomy





- Family History


Family History: Reports: None





- Social History


Smoking Status: Current every day smoker, Heavy tobacco smoker


Hx Substance Use: No


Alcohol Screening: None


Lives: With family





- Immunizations


Tetanus Shot up to Date: Yes





Physical Exam





- Physical Exam


Appearance: Well-appearing, No pain distress, Well-nourished


Eyes: OTIS, EOMI, Conjunctiva clear


ENT: Ears normal, Nose normal, Oropharynx normal


Neck: Supple


Respiratory: Airway patent, Breath sounds clear, Breath sounds equal, 

Respirations nonlabored


Cardiovascular: RRR, Pulses normal, No rub, No murmur


GI/: Soft, Nontender, No masses, Bowel sounds normal, No Organomegaly


Musculoskeletal: Normal strength, ROM intact, No edema, No calf tenderness


Skin: Warm, Dry, Normal color


Neurological: Sensation intact, Motor intact, Reflexes intact, Cranial nerves 

intact, Alert, Oriented


Psychiatric: Affect appropriate, Mood appropriate





Critical Care Note





- Critical Care Note


Total Time (mins): 0





Course





- Course


Orders, Labs, Meds: 





Orders











 Category Date Time Status


 


 URINALYSIS C & S IF INDICATED Stat LAB  03/25/18 03:40 Received











Vital Signs: 





 











  Temp Pulse Resp BP Pulse Ox


 


 03/25/18 15:25  98.8 F  88  20  120/79  98














Departure





- Departure


Time of Disposition: 15:48


Disposition: HOME SELF-CARE


Discharge Problem: 


 Cystitis





Instructions:  Urinary Tract Infection in Women (ED)


Condition: Good


Pt referred to PMD for follow-up: Yes


IPMP verified?: No


Additional Instructions: 


cipro 500mg bid x  7days--pyridium 200mg tid with food #6--push fluids--recheck 

ua and culture with pcp


Allergies/Adverse Reactions: 


Allergies





No Known Allergies Allergy (Verified 03/25/18 15:35)


 








Home Medications: 


Ambulatory Orders





1 [No Reported Medications]  03/25/18 








Disposition Discussed With: Patient

## 2018-09-08 ENCOUNTER — HOSPITAL ENCOUNTER (EMERGENCY)
Facility: HOSPITAL | Age: 33
Discharge: HOME | End: 2018-09-08
Admitting: EMERGENCY MEDICINE

## 2018-09-08 ENCOUNTER — APPOINTMENT (OUTPATIENT)
Dept: GENERAL RADIOLOGY | Facility: HOSPITAL | Age: 33
End: 2018-09-08

## 2018-09-08 VITALS
RESPIRATION RATE: 16 BRPM | HEART RATE: 83 BPM | HEIGHT: 63 IN | TEMPERATURE: 98.8 F | DIASTOLIC BLOOD PRESSURE: 75 MMHG | SYSTOLIC BLOOD PRESSURE: 110 MMHG | BODY MASS INDEX: 28.35 KG/M2 | WEIGHT: 160 LBS | OXYGEN SATURATION: 98 %

## 2018-09-08 DIAGNOSIS — H66.90 ACUTE OTITIS MEDIA, UNSPECIFIED OTITIS MEDIA TYPE: ICD-10-CM

## 2018-09-08 DIAGNOSIS — J32.0 MAXILLARY SINUSITIS, UNSPECIFIED CHRONICITY: Primary | ICD-10-CM

## 2018-09-08 LAB
B-HCG UR QL: NEGATIVE
INTERNAL NEGATIVE CONTROL: NEGATIVE
INTERNAL POSITIVE CONTROL: POSITIVE
Lab: NORMAL

## 2018-09-08 PROCEDURE — 99283 EMERGENCY DEPT VISIT LOW MDM: CPT

## 2018-09-08 PROCEDURE — 71046 X-RAY EXAM CHEST 2 VIEWS: CPT

## 2018-09-08 PROCEDURE — 81025 URINE PREGNANCY TEST: CPT | Performed by: PHYSICIAN ASSISTANT

## 2018-09-08 RX ORDER — AMOXICILLIN AND CLAVULANATE POTASSIUM 875; 125 MG/1; MG/1
1 TABLET, FILM COATED ORAL EVERY 12 HOURS
Qty: 14 TABLET | Refills: 0 | Status: SHIPPED | OUTPATIENT
Start: 2018-09-08

## 2018-09-08 NOTE — ED PROVIDER NOTES
Subjective   History of Present Illness  33-year-old female presents with chief complaint of one-week history of frontal sinus pressure radiates to her right ear and intermittent cough that is dry nonproductive.  She denies fevers chills nausea vomiting or diarrhea.  She reports she has had similar symptoms in the past was diagnosis sinusitis.  She had been taking over-the-counter sinus severe cold fluid this has not relieved her symptoms.  Denies pregnancy.  Review of Systems   All other systems reviewed and are negative.      History reviewed. No pertinent past medical history.    No Known Allergies    Past Surgical History:   Procedure Laterality Date   • ESSURE TUBAL LIGATION         Family History   Problem Relation Age of Onset   • Hypertension Mother    • Diabetes Mother    • Lung cancer Paternal Grandmother        Social History     Social History   • Marital status: Single     Social History Main Topics   • Smoking status: Current Every Day Smoker     Packs/day: 0.50     Types: Cigarettes   • Smokeless tobacco: Never Used   • Alcohol use No   • Drug use: No   • Sexual activity: Yes     Partners: Male     Birth control/ protection: Surgical      Comment: Essure, HSG showed patent right tube but does not use any other birth control     Other Topics Concern   • Not on file           Objective   Physical Exam   Constitutional: She is oriented to person, place, and time. She appears well-developed and well-nourished.   HENT:   Head: Normocephalic.   Right tm dull and poorly reflective    Frontal and maxillary sinus tenderness   Eyes: Pupils are equal, round, and reactive to light. EOM are normal.   Neck: Normal range of motion. Neck supple.   Cardiovascular: Normal rate and regular rhythm.    Pulmonary/Chest: Effort normal and breath sounds normal.   Abdominal: Soft. Bowel sounds are normal.   Musculoskeletal: Normal range of motion.   Lymphadenopathy:     She has no cervical adenopathy.   Neurological: She is  alert and oriented to person, place, and time.   Skin: Skin is warm and dry.   Psychiatric: She has a normal mood and affect. Her behavior is normal.   Nursing note and vitals reviewed.      Procedures           ED Course      XR Chest 2 View   Final Result   Impression: No evidence of acute cardiopulmonary disease.                                                    This report was finalized on 09/08/2018 14:44 by Dr. Yvon Pierce MD.        Labs Reviewed   POCT PEFORM URINE PREGNANCY - Normal                 MDM  Number of Diagnoses or Management Options  Diagnosis management comments: Negative cxr, will treat for sinusitis       Amount and/or Complexity of Data Reviewed  Clinical lab tests: ordered and reviewed  Tests in the radiology section of CPT®: ordered and reviewed  Tests in the medicine section of CPT®: reviewed and ordered    Risk of Complications, Morbidity, and/or Mortality  Presenting problems: moderate  Diagnostic procedures: moderate  Management options: moderate    Patient Progress  Patient progress: stable        Final diagnoses:   Maxillary sinusitis, unspecified chronicity   Acute otitis media, unspecified otitis media type            Russ Caceres PA-C  09/08/18 1959

## 2018-09-08 NOTE — DISCHARGE INSTRUCTIONS
Follow up with one of the [facility] physician groups below to setup primary care. If you have trouble following up, please call the [facility] Nurse Line at [phone]    (Dr. Efren Ellison DO,  Manasa Seaman APRRICCO, and MADELYN Pena)  [facility], Primary Care   [address], [address], [place], [place] [address] [phone]     (Dr. Diann Mallory MD, MADELYN Stover, and MADELYN Stapleton)  [facility], Primary Care   [address], [place], [place] [address] [phone]    (Dr. Miah Jerry MD and Dr. Star Martinez MD)  [facility], Primary Care  [address], [address] [phone]    (Dr. Meir Serrano MD)  [facility], Primary Care  [address], [address], [place], [place], [address] [phone]

## 2019-01-12 ENCOUNTER — HOSPITAL ENCOUNTER (EMERGENCY)
Dept: HOSPITAL 58 - ED | Age: 34
Discharge: HOME | End: 2019-01-12

## 2019-01-12 VITALS — SYSTOLIC BLOOD PRESSURE: 128 MMHG | DIASTOLIC BLOOD PRESSURE: 85 MMHG | TEMPERATURE: 98.5 F

## 2019-01-12 VITALS — BODY MASS INDEX: 26.9 KG/M2

## 2019-01-12 DIAGNOSIS — F17.210: ICD-10-CM

## 2019-01-12 DIAGNOSIS — N39.0: Primary | ICD-10-CM

## 2019-01-12 PROCEDURE — 96361 HYDRATE IV INFUSION ADD-ON: CPT

## 2019-01-12 PROCEDURE — 85025 COMPLETE CBC W/AUTO DIFF WBC: CPT

## 2019-01-12 PROCEDURE — 87086 URINE CULTURE/COLONY COUNT: CPT

## 2019-01-12 PROCEDURE — 80053 COMPREHEN METABOLIC PANEL: CPT

## 2019-01-12 PROCEDURE — 83690 ASSAY OF LIPASE: CPT

## 2019-01-12 PROCEDURE — 99283 EMERGENCY DEPT VISIT LOW MDM: CPT

## 2019-01-12 PROCEDURE — 87186 SC STD MICRODIL/AGAR DIL: CPT

## 2019-01-12 PROCEDURE — 82150 ASSAY OF AMYLASE: CPT

## 2019-01-12 PROCEDURE — 36415 COLL VENOUS BLD VENIPUNCTURE: CPT

## 2019-01-12 PROCEDURE — 81001 URINALYSIS AUTO W/SCOPE: CPT

## 2019-01-12 PROCEDURE — 74176 CT ABD & PELVIS W/O CONTRAST: CPT

## 2019-01-12 PROCEDURE — 96374 THER/PROPH/DIAG INJ IV PUSH: CPT

## 2019-01-12 NOTE — ED.PDOC
General


ED Provider: 


Dr. JEANINE GRANT





Chief Complaint: Urinary Problem


Stated Complaint: Patient complains of right lower back pain with dysurea. Took 

azo for 2-3 day but not better.


Time Seen by Physician: 19:28


Mode of Arrival: Walk-In


Information Source: Patient


Exam Limitations: No limitations


Primary Care Provider: 


YUE ROGERS





Nursing and Triage Documentation Reviewed and Agree: Yes


Does patient meet sepsis criteria?: No


System Inflammatory Response Syndrome: Not Applicable


Sepsis Protocol: 


For patient's 13 years and over:





Temp is 96.8 and below  and greater


Pulse >90 BPM


Resp >20/minute


Acutely Altered Mental Status





Are patient's symptoms suggestive of a new infection, such as:


   -Pneumonia


   -Skin, Soft Tissue


   -Endocarditis


   -UTI


   -Bone, Joint Infection


   -Implantable Device


   -Acute Abdominal Infection


   -Wound Infection


   -Meningitis


   -Blood Stream Catheter Infection


   -Unknown








Review of Systems





- Review Of Systems


Constitutional: Reports: No symptoms


Eyes: Reports: No symptoms


Ears, Nose, Mouth, Throat: Reports: No symptoms


Respiratory: Reports: No symptoms


Cardiac: Reports: No symptoms


GI: Reports: No symptoms


: Reports: Burning, Dysuria, Flank pain


Musculoskeletal: Reports: Back pain


Skin: Reports: No symptoms


Neurological: Reports: Anxiety


Endocrine: Reports: No symptoms


Hematologic/Lymphatic: Reports: No symptoms


All Other Systems: Reviewed and Negative





Past Medical History





- Past Medical History


Previously Healthy: Yes


Endocrine: Reports: None


Cardiovascular: Reports: None


Respiratory: Reports: None


Hematological: Reports: Anemia


Gastrointestinal: Reports: Gallstones


Genitourinary: Reports: None


Neuro/Psych: Reports: Migraine


Musculoskeletal: Reports: None


Cancer: Reports: None


Last Menstrual Period: PRESENTLY





- Surgical History


General Surgical History: Reports: Tubal ligation, Cholecystectomy





- Family History


Family History: Reports: None





- Social History


Smoking Status: Current every day smoker, Heavy tobacco smoker


Hx Substance Use: No


Alcohol Screening: None





- Immunizations


Tetanus Shot up to Date: Yes





Physical Exam





- Physical Exam


Appearance: Ill-appearing


Ill-appearing: Mild


Pain Distress: Severe


Eyes: TOIS, EOMI, Conjunctiva clear


ENT: Ears normal, Nose normal, Oropharynx normal


Neck: Supple


Respiratory: Airway patent, Breath sounds clear, Breath sounds equal, 

Respirations nonlabored


Cardiovascular: RRR, Pulses normal, No rub, No murmur


GI/: Soft, Nontender, No masses, Bowel sounds normal, No Organomegaly


Musculoskeletal: Normal strength, ROM intact, No edema, No calf tenderness


Skin: Warm, Dry, Normal color


Neurological: Sensation intact, Motor intact, Cranial nerves intact, Alert, 

Oriented


Psychiatric: Affect appropriate, Mood appropriate





Critical Care Note





- Critical Care Note


Total Time (mins): 0





Course





- Course


Hematology/Chemistry: 


 01/12/19 19:35





 01/12/19 19:35


Orders, Labs, Meds: 


Lab Review











  01/12/19 01/12/19 01/12/19





  18:55 19:35 19:35


 


WBC   12.14 H 


 


RBC   4.88 


 


Hgb   13.7 


 


Hct   42.8 


 


MCV   87.7 


 


MCH   28.1 


 


MCHC   32.0 


 


RDW Coeff of Joseph   14.0 


 


Plt Count   264 


 


Immature Gran % (Auto)   0.3 


 


Neut % (Auto)   65.5 


 


Lymph % (Auto)   26.0 


 


Mono % (Auto)   6.5 


 


Eos % (Auto)   1.4 


 


Baso % (Auto)   0.3 


 


Immature Gran # (Auto)   0.0 


 


Neut # (Auto)   7.9 H 


 


Lymph # (Auto)   3.2 


 


Mono # (Auto)   0.8 


 


Eos # (Auto)   0.2 


 


Baso # (Auto)   0.0 


 


Sodium    137.8


 


Potassium    4.01


 


Chloride    103.7


 


Carbon Dioxide    27.6


 


Anion Gap    10.51


 


BUN    12.8


 


Creatinine    0.69


 


Estimated GFR (MDRD)    99.00


 


BUN/Creatinine Ratio    18.55


 


Glucose    89.2


 


Calcium    9.00


 


Total Bilirubin    0.87


 


AST    23.0


 


ALT    33.3


 


Alkaline Phosphatase    145.1 H


 


Total Protein    7.69


 


Albumin    4.31


 


Globulin    3.38


 


Albumin/Globulin Ratio    1.27


 


Amylase    84.5


 


Lipase    91.3


 


Urine Color  Yellow  


 


Urine Clarity  Slightly  


 


Urine pH  7.5  


 


Ur Specific Gravity  1.015  


 


Urine Protein  Negative  


 


Urine Glucose (UA)  Negative  


 


Urine Ketones  Negative  


 


Urine Blood  2+  


 


Urine Nitrite  Negative  


 


Urine Bilirubin  Negative  


 


Urine Urobilinogen  1.0  


 


Ur Leukocyte Esterase  Trace  


 


Urine Microscopic RBC  2-5  


 


Urine Microscopic WBC  5-10  


 


Ur Squamous Epith Cells  0-2  


 


Urine Bacteria  Trace  








Orders











 Category Date Time Status


 


 ED IV/MEDIPORT/POWERPORT .ONCE EMERGENCY  01/12/19 19:28 Active


 


 AMYLASE Stat LAB  01/12/19 19:35 Completed


 


 CBC W/ AUTO DIFF Stat LAB  01/12/19 19:35 Completed


 


 COMPREHENSIVE METABOLIC PANEL Stat LAB  01/12/19 19:35 Completed


 


 LIPASE Stat LAB  01/12/19 19:35 Completed


 


 URINALYSIS C & S IF INDICATED Stat LAB  01/12/19 18:55 Completed


 


 URINE CULTURE Stat LAB  01/12/19 19:49 Received


 


 0.9 % Sodium Chloride [Saline Flush] MEDS  01/12/19 19:28 Discontinued





 1 syr IVF PRN PRN   


 


 Ketorolac Tromethamine [Toradol] MEDS  01/12/19 19:28 Discontinued





 30 mg IVP ONCE STA   


 


 Nitrofurantoin Monohyd/M-Cryst [Macrobid] MEDS  01/12/19 20:52 Discontinued





 100 mg PO ONCE STA   


 


 Phenazopyridine HCl [Pyridium] MEDS  01/12/19 20:52 Discontinued





 100 mg PO ONCE STA   


 


 Sodium Chloride 0.9% [Sodium Chloride] 1,000 ml MEDS  01/12/19 19:28 

Discontinued





 IV BOLUS   


 


 CT ABD/PEL WO RENAL STONE PROT Stat RADS  01/12/19 19:59 Completed








Medications














Discontinued Medications














Generic Name Dose Route Start Last Admin





  Trade Name Freq  PRN Reason Stop Dose Admin


 


Sodium Chloride  1,000 mls @ 1,000 mls/hr  01/12/19 19:28  01/12/19 20:01





  Sodium Chloride  IV  01/12/19 20:27  1,000 mls/hr





  BOLUS STA   Administration





     





     





     





     


 


Ketorolac Tromethamine  30 mg  01/12/19 19:28  01/12/19 20:02





  Toradol  IVP  01/12/19 19:29  30 mg





  ONCE STA   Administration





     





     





     





     


 


Nitrofurantoin Macrocrystals  100 mg  01/12/19 20:52  01/12/19 20:57





  Macrobid  PO  01/12/19 20:53  100 mg





  ONCE STA   Administration





     





     





     





     


 


Phenazopyridine HCl  100 mg  01/12/19 20:52  01/12/19 20:57





  Pyridium  PO  01/12/19 20:53  100 mg





  ONCE STA   Administration





     





     





     





     


 


Sodium Chloride  1 syr  01/12/19 19:28  01/12/19 19:58





  Saline Flush  IVF   1 syr





  PRN PRN   Administration





  To flush IV   





     





     





     











Vital Signs: 


 











  Temp Pulse Resp BP Pulse Ox


 


 01/12/19 18:38  98.5 F  83  20  128/85  97














Departure





- Departure


Time of Disposition: 20:50


Disposition: HOME SELF-CARE


Discharge Problem: 


 Urinary tract infectious disease





Instructions:  Urinary Tract Infection in Women (ED)


Condition: Good


Pt referred to PMD for follow-up: Yes


IPMP verified?: No


Additional Instructions: 


take Medications as prescribed 


Follow up with PCP in 3 days 


Prescriptions: 


Nitrofurantoin Monohyd/M-Cryst [Macrobid] 100 mg PO BID #14 capsule


Phenazopyridine HCl [Pyridium] 100 mg PO TID PRN #10 tablet


 PRN Reason: Urinary Burning. 


Tramadol HCl [Ultram] 50 mg PO Q6H PRN #14 tablet


 PRN Reason: Severe Pain


Allergies/Adverse Reactions: 


Allergies





No Known Allergies Allergy (Verified 01/12/19 18:45)


 








Home Medications: 


Ambulatory Orders





Nitrofurantoin Monohyd/M-Cryst [Macrobid] 100 mg PO BID #14 capsule 01/12/19 


Phenazopyridine HCl [Pyridium] 100 mg PO TID PRN #10 tablet 01/12/19 


Tramadol HCl [Ultram] 50 mg PO Q6H PRN #14 tablet 01/12/19 








Disposition Discussed With: Patient

## 2019-01-12 NOTE — CT
EXAM: Noncontrast CT of the abdomen and pelvis 

  

HISTORY: Right flank pain 

  

COMPARISON: 09/21/2017 

  

TECHNIQUE: Axial noncontrast CT of the abdomen and pelvis with sagittal and coronal reformats. 

  

FINDINGS: 

There is minimal fullness of the right renal collecting system.  No right renal calculi are identifie
d.  Some portions of the right ureter are obscured.  No calcifications are identified along the expec
cary course of the right ureter to suggest ureteral calculi.  No left renal calculi are seen.  No calc
ifications are seen along the expected course of the left ureter. 

  

Noncontrast technique limits evaluation of abdominal viscera.  The unenhanced liver, spleen, adrenals
, left kidney and pancreas appear unremarkable. 

  

Ingested material is seen in the stomach.  No abnormal small bowel dilation is seen.  The colon appea
rs within normal limits.  Moderate ascending colonic stool is seen.  There are is mild colonic stool 
more distally.  The appendix is not abnormally enlarged. 

  

A tampon is noted.  Bilateral tubal closure devices are also seen.  There is trace free pelvic fluid.
  No free air is seen. 

  

IMPRESSION: 

No renal calculi.  Minimal fullness of the right renal collecting system without tami hydronephrosis
.  Obscuration of some portions of both ureters without identification of calcifications along their 
expected course to suggest ureteral calculi. 

  

Trace free pelvic fluid. 

  

Appendix within normal limits. 

  

Moderate colonic stool burden.

## 2019-02-11 ENCOUNTER — HOSPITAL ENCOUNTER (EMERGENCY)
Dept: HOSPITAL 58 - ED | Age: 34
Discharge: HOME | End: 2019-02-11

## 2019-02-11 VITALS — SYSTOLIC BLOOD PRESSURE: 143 MMHG | DIASTOLIC BLOOD PRESSURE: 79 MMHG | TEMPERATURE: 98 F

## 2019-02-11 VITALS — BODY MASS INDEX: 28 KG/M2

## 2019-02-11 DIAGNOSIS — Z72.0: ICD-10-CM

## 2019-02-11 DIAGNOSIS — N39.0: Primary | ICD-10-CM

## 2019-02-11 LAB — HCG UR QL: NEGATIVE

## 2019-02-11 PROCEDURE — 99283 EMERGENCY DEPT VISIT LOW MDM: CPT

## 2019-02-11 PROCEDURE — 81001 URINALYSIS AUTO W/SCOPE: CPT

## 2019-02-11 PROCEDURE — 87086 URINE CULTURE/COLONY COUNT: CPT

## 2019-02-11 PROCEDURE — 87186 SC STD MICRODIL/AGAR DIL: CPT

## 2019-02-11 PROCEDURE — 81025 URINE PREGNANCY TEST: CPT

## 2019-02-11 NOTE — ED.PDOC
General


ED Provider: 


Dr. RAZ SEXTON





Chief Complaint: Urinary Problem


Stated Complaint: dysuria


Time Seen by Physician: 10:40 (rn present at all times )


Mode of Arrival: Walk-In


Information Source: Patient


Exam Limitations: No limitations


Primary Care Provider: 


YUE ROGERS





Nursing and Triage Documentation Reviewed and Agree: Yes


Does patient meet sepsis criteria?: Yes


If yes, has appropriate treatment been initiated?: No


System Inflammatory Response Syndrome: Not Applicable


Sepsis Protocol: 


For patient's 13 years and over:





Temp is 96.8 and below  and greater


Pulse >90 BPM


Resp >20/minute


Acutely Altered Mental Status





Are patient's symptoms suggestive of a new infection, such as:


   -Pneumonia


   -Skin, Soft Tissue


   -Endocarditis


   -UTI


   -Bone, Joint Infection


   -Implantable Device


   -Acute Abdominal Infection


   -Wound Infection


   -Meningitis


   -Blood Stream Catheter Infection


   -Unknown








 Complaint Exam





-  Complaint/Exam


Patient Complains of: Reports: Dysuria


Onset/Duration: 4 days


Symptoms Are: Still present


Timing: Intermittent


Initial Severity: Moderate


Current Severity: Mild


Location of Pain: Reports: Suprapubic


Character: Reports: Burning


Aggravating: Reports: Urination


Alleviating: Reports: None


Associated Signs and Symptoms: Reports: Dysuria.  Denies: Diaphoresis, Back pain

, Fever, Hematuria, Constipation, Blood in stool, Rectal pain, Appetite change, 

Nausea, Vomiting, Decreased urine output, Increased urine frequency, Increased 

thirst, Decreased activity, Lethargy, Abdominal Pain, Bubble bath use, Vaginal 

bleeding, Vaginal discharge, Genital swelling, Genital blisters, Retained 

foreign body


Ectopic Pregnancy Risk Factors: Reports: None


Ovarian Torsion Risk Factors: Reports: None


Surgical Obstruction Risk Factors: Reports: None


RH Status: Unknown


Related Surgical History: Reports: None


Abdominal Findings: Present: None


Differential Diagnoses: UTI





Review of Systems





- Review Of Systems


Constitutional: Reports: No symptoms


Eyes: Reports: No symptoms


Ears, Nose, Mouth, Throat: Reports: No symptoms


Respiratory: Reports: No symptoms


Cardiac: Reports: No symptoms


GI: Reports: No symptoms


: Reports: Dysuria


Musculoskeletal: Reports: No symptoms


Skin: Reports: No symptoms


Neurological: Reports: No symptoms


Endocrine: Reports: No symptoms


Hematologic/Lymphatic: Reports: No symptoms


All Other Systems: Reviewed and Negative





Past Medical History





- Past Medical History


Previously Healthy: Yes


Endocrine: Reports: None


Cardiovascular: Reports: None


Respiratory: Reports: None


Hematological: Reports: Anemia


Gastrointestinal: Reports: Gallstones


Genitourinary: Reports: None


Neuro/Psych: Reports: Migraine


Musculoskeletal: Reports: None


Cancer: Reports: None


Last Menstrual Period: last week





- Surgical History


General Surgical History: Reports: Tubal ligation, Cholecystectomy





- Family History


Family History: Reports: None





- Social History


Smoking Status: Vaping


Hx Substance Use: No


Alcohol Screening: Occasionally





Physical Exam





- Physical Exam


Appearance: Well-appearing, No pain distress, Well-nourished


Eyes: OTIS, EOMI, Conjunctiva clear


ENT: Ears normal, Nose normal, Oropharynx normal


Respiratory: Airway patent, Breath sounds clear, Breath sounds equal, 

Respirations nonlabored


Cardiovascular: RRR, Pulses normal, No rub, No murmur


GI/: Soft, Nontender, No masses, Bowel sounds normal, No Organomegaly


Musculoskeletal: Normal strength, ROM intact, No edema, No calf tenderness


Skin: Warm, Dry, Normal color


Neurological: Sensation intact, Motor intact, Reflexes intact, Cranial nerves 

intact, Alert, Oriented


Psychiatric: Affect appropriate, Mood appropriate





Critical Care Note





- Critical Care Note


Total Time (mins): 0





Course





- Course


Orders, Labs, Meds: 





Lab Review











  02/11/19 02/11/19





  10:55 10:55


 


Urine Color  Yellow 


 


Urine Clarity  Slightly 


 


Urine pH  6.0 


 


Ur Specific Gravity  1.010 


 


Urine Protein  1+ 


 


Urine Glucose (UA)  Negative 


 


Urine Ketones  Negative 


 


Urine Blood  3+ 


 


Urine Nitrite  Negative 


 


Urine Bilirubin  Negative 


 


Urine Urobilinogen  0.2 


 


Ur Leukocyte Esterase  3+ 


 


Urine Microscopic RBC  10-20 


 


Urine Microscopic WBC  10-20 


 


Ur Squamous Epith Cells  2-5 


 


Urine Bacteria  1+ 


 


Urine Pregnancy Test   Negative








Orders











 Category Date Time Status


 


 URINALYSIS C & S IF INDICATED Stat LAB  02/11/19 10:55 Completed


 


 URINE CULTURE Stat LAB  02/11/19 10:55 Received


 


 URINE PREGNANCY Stat LAB  02/11/19 10:55 Completed











Vital Signs: 





 











  Temp Pulse Resp BP Pulse Ox


 


 02/11/19 10:37  98 F  106 H  20  143/79 H  98














Departure





- Departure


Time of Disposition: 11:22


Disposition: HOME SELF-CARE


Discharge Problem: 


 Urinary symptoms, Urinary tract infectious disease





Instructions:  Urinary Tract Infection in Women (DC)


Condition: Good


Pt referred to PMD for follow-up: Yes


IPMP verified?: No


Additional Instructions: 


Please call your Family Physician as soon as possible to schedule a follow-up 

appointment.


Prescriptions: 


Ciprofloxacin HCl [Cipro] 500 mg PO Q12HR #10 tablet


Allergies/Adverse Reactions: 


Allergies





No Known Allergies Allergy (Verified 02/11/19 10:44)


 








Home Medications: 


Ambulatory Orders





Ciprofloxacin HCl [Cipro] 500 mg PO Q12HR #10 tablet 02/11/19